# Patient Record
Sex: FEMALE | URBAN - METROPOLITAN AREA
[De-identification: names, ages, dates, MRNs, and addresses within clinical notes are randomized per-mention and may not be internally consistent; named-entity substitution may affect disease eponyms.]

---

## 2022-01-31 ENCOUNTER — APPOINTMENT (RX ONLY)
Dept: URBAN - METROPOLITAN AREA CLINIC 2 | Facility: CLINIC | Age: 62
Setting detail: DERMATOLOGY
End: 2022-01-31

## 2022-01-31 DIAGNOSIS — B07.0 PLANTAR WART: ICD-10-CM

## 2022-01-31 PROBLEM — D23.39 OTHER BENIGN NEOPLASM OF SKIN OF OTHER PARTS OF FACE: Status: ACTIVE | Noted: 2022-01-31

## 2022-01-31 PROCEDURE — ? COSMETIC QUOTE

## 2022-01-31 PROCEDURE — ? DEFER

## 2022-01-31 PROCEDURE — ? OBSERVATION

## 2022-01-31 PROCEDURE — ? COUNSELING

## 2022-01-31 PROCEDURE — 99203 OFFICE O/P NEW LOW 30 MIN: CPT

## 2022-01-31 NOTE — PROCEDURE: DEFER
Instructions (Optional): Left supra medial cheek
Detail Level: Detailed
Introduction Text (Please End With A Colon): The following procedure was deferred:
Procedure To Be Performed At Next Visit: Biopsy by shave method

## 2022-01-31 NOTE — PROCEDURE: COSMETIC QUOTE
Voluma Price Per Syringe: [address]
[place]ys[place] Price Per Unit: 15
Notice: We have created a more complete Cosmetic Quote plan. The procedure name is also Cosmetic Quote. Please review the new plan and hide the Cosmetic Quote plan you do not want to use.
Restylane Price Per Syringe: 500
Discount Percentage: 0
Detail Level: Simple

## 2023-08-18 NOTE — PROCEDURE: OBSERVATION
Health Maintenance Due   Topic Date Due   • Hepatitis B Vaccine (1 of 3 - 3-dose series) Never done   • Varicella Vaccine (1 of 2 - 2-dose childhood series) Never done   • HPV Vaccine (1 - 2-dose series) Never done   • Chlamydia and Gonorrhea Screening (if sexually active)  Never done   • COVID-19 Vaccine (3 - Pfizer series) 10/25/2021       Patient is due for the topics as listed above and wishes to proceed with them.   
Body Location Override (Optional - Billing Will Still Be Based On Selected Body Map Location If Applicable): nasal supra tip
Detail Level: Detailed
Size Of Lesion In Cm (Optional): 0